# Patient Record
Sex: MALE | Race: WHITE | NOT HISPANIC OR LATINO | Employment: UNEMPLOYED | URBAN - METROPOLITAN AREA
[De-identification: names, ages, dates, MRNs, and addresses within clinical notes are randomized per-mention and may not be internally consistent; named-entity substitution may affect disease eponyms.]

---

## 2022-12-05 ENCOUNTER — OFFICE VISIT (OUTPATIENT)
Dept: FAMILY MEDICINE | Facility: CLINIC | Age: 22
End: 2022-12-05
Payer: COMMERCIAL

## 2022-12-05 ENCOUNTER — TELEPHONE (OUTPATIENT)
Dept: PSYCHIATRY | Facility: CLINIC | Age: 22
End: 2022-12-05
Payer: COMMERCIAL

## 2022-12-05 VITALS
BODY MASS INDEX: 18.68 KG/M2 | HEART RATE: 79 BPM | RESPIRATION RATE: 18 BRPM | TEMPERATURE: 98 F | OXYGEN SATURATION: 99 % | SYSTOLIC BLOOD PRESSURE: 112 MMHG | HEIGHT: 69 IN | DIASTOLIC BLOOD PRESSURE: 80 MMHG | WEIGHT: 126.13 LBS

## 2022-12-05 DIAGNOSIS — G43.009 MIGRAINE WITHOUT AURA AND WITHOUT STATUS MIGRAINOSUS, NOT INTRACTABLE: ICD-10-CM

## 2022-12-05 DIAGNOSIS — F41.0 ANXIETY DISORDER DUE TO GENERAL MEDICAL CONDITION WITH PANIC ATTACK: Primary | ICD-10-CM

## 2022-12-05 DIAGNOSIS — F32.1 MODERATE MAJOR DEPRESSION: ICD-10-CM

## 2022-12-05 DIAGNOSIS — F06.4 ANXIETY DISORDER DUE TO GENERAL MEDICAL CONDITION WITH PANIC ATTACK: Primary | ICD-10-CM

## 2022-12-05 PROBLEM — G43.909 MIGRAINES: Status: ACTIVE | Noted: 2022-12-05

## 2022-12-05 PROCEDURE — 3074F SYST BP LT 130 MM HG: CPT | Mod: CPTII,S$GLB,, | Performed by: INTERNAL MEDICINE

## 2022-12-05 PROCEDURE — 3074F PR MOST RECENT SYSTOLIC BLOOD PRESSURE < 130 MM HG: ICD-10-PCS | Mod: CPTII,S$GLB,, | Performed by: INTERNAL MEDICINE

## 2022-12-05 PROCEDURE — 3008F BODY MASS INDEX DOCD: CPT | Mod: CPTII,S$GLB,, | Performed by: INTERNAL MEDICINE

## 2022-12-05 PROCEDURE — 3008F PR BODY MASS INDEX (BMI) DOCUMENTED: ICD-10-PCS | Mod: CPTII,S$GLB,, | Performed by: INTERNAL MEDICINE

## 2022-12-05 PROCEDURE — 99204 PR OFFICE/OUTPT VISIT, NEW, LEVL IV, 45-59 MIN: ICD-10-PCS | Mod: S$GLB,,, | Performed by: INTERNAL MEDICINE

## 2022-12-05 PROCEDURE — 99999 PR PBB SHADOW E&M-NEW PATIENT-LVL V: ICD-10-PCS | Mod: PBBFAC,,, | Performed by: INTERNAL MEDICINE

## 2022-12-05 PROCEDURE — 99999 PR PBB SHADOW E&M-NEW PATIENT-LVL V: CPT | Mod: PBBFAC,,, | Performed by: INTERNAL MEDICINE

## 2022-12-05 PROCEDURE — 1160F RVW MEDS BY RX/DR IN RCRD: CPT | Mod: CPTII,S$GLB,, | Performed by: INTERNAL MEDICINE

## 2022-12-05 PROCEDURE — 1159F PR MEDICATION LIST DOCUMENTED IN MEDICAL RECORD: ICD-10-PCS | Mod: CPTII,S$GLB,, | Performed by: INTERNAL MEDICINE

## 2022-12-05 PROCEDURE — 3079F PR MOST RECENT DIASTOLIC BLOOD PRESSURE 80-89 MM HG: ICD-10-PCS | Mod: CPTII,S$GLB,, | Performed by: INTERNAL MEDICINE

## 2022-12-05 PROCEDURE — 1159F MED LIST DOCD IN RCRD: CPT | Mod: CPTII,S$GLB,, | Performed by: INTERNAL MEDICINE

## 2022-12-05 PROCEDURE — 3079F DIAST BP 80-89 MM HG: CPT | Mod: CPTII,S$GLB,, | Performed by: INTERNAL MEDICINE

## 2022-12-05 PROCEDURE — 1160F PR REVIEW ALL MEDS BY PRESCRIBER/CLIN PHARMACIST DOCUMENTED: ICD-10-PCS | Mod: CPTII,S$GLB,, | Performed by: INTERNAL MEDICINE

## 2022-12-05 PROCEDURE — 99204 OFFICE O/P NEW MOD 45 MIN: CPT | Mod: S$GLB,,, | Performed by: INTERNAL MEDICINE

## 2022-12-05 RX ORDER — ESCITALOPRAM OXALATE 10 MG/1
10 TABLET ORAL DAILY
Qty: 30 TABLET | Refills: 0 | Status: SHIPPED | OUTPATIENT
Start: 2022-12-05 | End: 2023-12-05

## 2022-12-05 NOTE — TELEPHONE ENCOUNTER
Pt called ot make NP appt. [facility] Appt made tyra/ shaniqua for Bruno 3 @ 8am. Pt made aware no benzos or stimulants rx'd. No concerns voiced.

## 2022-12-05 NOTE — PROGRESS NOTES
SUBJECTIVE     Chief Complaint   Patient presents with    Establish [facility]  Pasha Gomez is a 22 y.o. male with multiple medical diagnoses as listed in the medical history and problem list that presents for establishment of care. Pt has physical, but mostly mental ailments. He has desire and appetite to eat, but when he does he has nausea. Pt also lacks the energy to eat. Pt also reports fatigue, but has insomnia with difficulty falling and staying asleep. Pt also report nightmares. He has some memory impairment(short-term with some long-term). He also has bad anxiety with occasional panic attacks such that he feels something sitting on his chest with difficulty breathing. His head also spins and he feels like something bad will happen all the time. Pt denies any SI/HI. He would like to see Psychiatry and start meds.     PAST MEDICAL HISTORY:  Past Medical History:   Diagnosis Date    Migraines        PAST SURGICAL HISTORY:  History reviewed. No pertinent surgical history.    SOCIAL HISTORY:  Social History     Socioeconomic History    Marital status: Single   Tobacco Use    Smoking status: Never    Smokeless tobacco: Never   Substance and Sexual Activity    Alcohol use: Not Currently    Drug use: Not Currently       FAMILY HISTORY:  Family History   Problem Relation Age of Onset    Diabetes type I Mother     Squamous cell carcinoma Father         metastatic    Diabetes type II Maternal Grandmother        ALLERGIES AND MEDICATIONS: updated and reviewed.  Review of patient's allergies indicates:  No Known Allergies  Current Outpatient Medications   Medication Sig Dispense Refill    EScitalopram oxalate (LEXAPRO) 10 MG tablet Take 1 tablet (10 mg total) by mouth once daily. 30 tablet 0     No current facility-administered medications for this visit.       ROS  Review of Systems   Constitutional:  Negative for chills and fever.   HENT:  Negative for hearing loss and sore throat.    Eyes:  Negative for  "visual disturbance.   Respiratory:  Negative for cough and shortness of breath.    Cardiovascular:  Negative for chest pain, palpitations and leg swelling.   Gastrointestinal:  Negative for abdominal pain, constipation, diarrhea, nausea and vomiting.   Genitourinary:  Negative for dysuria, frequency and urgency.   Musculoskeletal:  Negative for arthralgias, joint swelling and myalgias.   Skin:  Negative for rash and wound.   Neurological:  Negative for headaches.   Psychiatric/Behavioral:  Positive for confusion, dysphoric mood and sleep disturbance. Negative for agitation. The patient is nervous/anxious.        OBJECTIVE     Physical Exam  Vitals:    12/05/22 0946   BP: 112/80   Pulse: 79   Resp: 18   Temp: 98.2 °F (36.8 °C)    Body mass index is 18.62 kg/m².  Weight: 57.2 kg (126 lb 1.7 oz)   Height: 5' 9" (175.3 cm)     Physical Exam  Constitutional:       General: He is not in acute distress.     Appearance: He is well-developed.   HENT:      Head: Normocephalic and atraumatic.      Right Ear: External ear normal.      Left Ear: External ear normal.      Nose: Nose normal.   Eyes:      General: No scleral icterus.        Right eye: No discharge.         Left eye: No discharge.      Conjunctiva/sclera: Conjunctivae normal.   Neck:      Vascular: No JVD.      Trachea: No tracheal deviation.   Pulmonary:      Effort: Pulmonary effort is normal. No respiratory distress.   Musculoskeletal:         General: No deformity. Normal range of motion.      Cervical back: Normal range of motion and neck supple.   Skin:     General: Skin is dry.      Findings: No erythema or rash.   Neurological:      Mental Status: He is alert and oriented to person, place, and time.      Motor: No abnormal muscle tone.      Coordination: Coordination normal.   Psychiatric:         Mood and Affect: Mood is depressed. Affect is flat.         Behavior: Behavior is slowed and withdrawn.         Thought Content: Thought content normal. Thought " content does not include homicidal or suicidal ideation.         Cognition and Memory: Memory is impaired.         Judgment: Judgment normal.         Health Maintenance         Date Due Completion Date    Hepatitis C Screening Never done ---    Lipid Panel Never done ---    COVID-19 Vaccine (1) Never done ---    HIV Screening Never done ---    TETANUS VACCINE Never done ---    Influenza Vaccine (1) Never done ---              ASSESSMENT     22 y.o. male with     1. Anxiety disorder due to general medical condition with panic attack    2. Moderate major depression    3. Migraine without aura and without status migrainosus, not intractable        PLAN:     1. Anxiety disorder due to general medical condition with panic attack  - Start trial Lexapro and patient to call and schedule appointment for psychiatry and counseling  - Ambulatory referral/consult to Psychiatry; Future  - EScitalopram oxalate (LEXAPRO) 10 MG tablet; Take 1 tablet (10 mg total) by mouth once daily.  Dispense: 30 tablet; Refill: 0    2. Moderate major depression  - as above  - Ambulatory referral/consult to Psychiatry; Future  - EScitalopram oxalate (LEXAPRO) 10 MG tablet; Take 1 tablet (10 mg total) by mouth once daily.  Dispense: 30 tablet; Refill: 0    3. Migraine without aura and without status migrainosus, not intractable  - Stable; no acute issues          RTC in 4 weeks for repeat assessment of current treatment plan       Roxanne Mott MD  12/05/2022 10:00 AM        No follow-ups on file.

## 2022-12-05 NOTE — LETTER
December 5, 2022      [facility] [place] [address] [address]  Phone: [phone]  Fax: [phone]       Patient: Pasha Gomez   YOB: 2000  Date of Visit: 12/05/2022    To Whom It May Concern:    Shefali Gomez  was at [facility] on 12/05/2022. The patient may return to work/school on 12/6/2022 with no restrictions. If you have any questions or concerns, or if I can be of further assistance, please do not hesitate to contact me.    Sincerely,    Roxanne Mott MD

## 2023-03-20 ENCOUNTER — TELEPHONE (OUTPATIENT)
Dept: PSYCHIATRY | Facility: CLINIC | Age: 23
End: 2023-03-20
Payer: COMMERCIAL

## 2024-11-12 ENCOUNTER — OFFICE VISIT (OUTPATIENT)
Dept: URGENT CARE | Facility: CLINIC | Age: 24
End: 2024-11-12
Payer: OTHER MISCELLANEOUS

## 2024-11-12 VITALS
DIASTOLIC BLOOD PRESSURE: 81 MMHG | TEMPERATURE: 98 F | BODY MASS INDEX: 18.61 KG/M2 | HEART RATE: 64 BPM | OXYGEN SATURATION: 98 % | WEIGHT: 130 LBS | RESPIRATION RATE: 18 BRPM | SYSTOLIC BLOOD PRESSURE: 119 MMHG | HEIGHT: 70 IN

## 2024-11-12 DIAGNOSIS — Z02.6 ENCOUNTER RELATED TO WORKER'S COMPENSATION CLAIM: Primary | ICD-10-CM

## 2024-11-12 DIAGNOSIS — S61.217A LACERATION OF LEFT LITTLE FINGER WITHOUT FOREIGN BODY WITHOUT DAMAGE TO NAIL, INITIAL ENCOUNTER: ICD-10-CM

## 2024-11-12 LAB
BREATH ALCOHOL: 0
CTP QC/QA: YES
POC 10 PANEL DRUG SCREEN: NEGATIVE

## 2024-11-12 NOTE — PROGRESS NOTES
Subjective:      Patient ID: Pasha Gomez is a 24 y.o. male.    Chief Complaint: Laceration (DOI: 11/12/2024 LT 5th digit/Lm)    Patient's place of employment - [employer]  Patient's job title -   Date of injury - 11/12/2024  Body part injured including left or right - LT 5th Digit  Injury Mechanism - Coke Can  What they were doing when they got hurt - Pt was getting a coke can that was stuck in bench, when he pulled the can out, it tore and cut Pt finger  What they did immediately after - Reported and cleaned the area  Pain scale right now - 1/10  LM    Patient is a 24-year-old male who cut his left 5th digit mid phalangeal region curvilinear laceration that closes on its own with a coat can.  He works at facilities.  Distally neurovascularly intact and superficial laceration.  Closed with Dermabond.  No foreign bodies.  Will be allowed to wear splint and limit the use of the left hand and work light duty for 1 week and return to clinic in 1 week for anticipated discharge at that time.  No complications with closure.  Return to clinic 1 week. LAST TETANUS 2020    Laceration   The incident occurred 1 to 3 hours ago. Pain location: 5th digit. Injury mechanism: coke can. The pain is at a severity of 1/10. The pain is mild. The pain has been Fluctuating since onset. His tetanus status is out of date.     ROS  Constitution: Negative for chills and fever.   HENT:  Negative for postnasal drip, sinus pain and sore throat.    Neck: Negative for neck pain and neck stiffness.   Cardiovascular:  Negative for chest pain and palpitations.   Respiratory:  Negative for cough and shortness of breath.    Genitourinary:  Negative for dysuria and hematuria.   Musculoskeletal:  Positive for pain and joint pain.   Skin:  Positive for laceration. Negative for wound and erythema.   Neurological:  Negative for altered mental status, numbness and tingling.   Psychiatric/Behavioral:  Negative for altered mental  status.      Objective:     Physical Exam  Vitals and nursing note reviewed.   Constitutional:       Appearance: He is well-developed.   HENT:      Head: Normocephalic and atraumatic. No abrasion, contusion or laceration.      Right Ear: External ear normal.      Left Ear: External ear normal.      Nose: Nose normal.   Eyes:      General: Lids are normal.      Conjunctiva/sclera: Conjunctivae normal.      Pupils: Pupils are equal, round, and reactive to light.   Neck:      Trachea: Trachea and phonation normal.   Cardiovascular:      Rate and Rhythm: Normal rate and regular rhythm.      Heart sounds: Normal heart sounds.   Pulmonary:      Effort: Pulmonary effort is normal. No respiratory distress.      Breath sounds: Normal breath sounds. No stridor.   Musculoskeletal:         General: Tenderness and signs of injury present. No swelling. Normal range of motion.      Cervical back: Full passive range of motion without pain and neck supple.   Skin:     General: Skin is warm and dry.      Capillary Refill: Capillary refill takes less than 2 seconds.      Findings: No abrasion, bruising, burn, ecchymosis, erythema, laceration, lesion or rash.      Comments: 1 CM CURVILINEAR LACERATION TO THE LEFT 5TH DIGIT VOLAR SURFACE.  COMES TOGETHER NATURALLY.  NORMAL FLEXION AND EXTENSION AGAINST RESISTANCE NO FOREIGN BODIES DISTALLY NEUROVASCULARLY INTACT.  AMENABLE TO DERMABOND.  CLOSED WITH DERMABOND AND SPLINTED.   Neurological:      Mental Status: He is alert and oriented to person, place, and time.   Psychiatric:         Speech: Speech normal.         Behavior: Behavior normal.         Thought Content: Thought content normal.         Judgment: Judgment normal.        Laceration Repair    Date/Time: 11/12/2024 10:45 AM    Performed by: Vargas Scruggs MD  Authorized by: Vargas Scruggs MD  Consent Done: Yes  Consent: Verbal consent obtained. Written consent not obtained.  Risks and benefits: risks, benefits and  "alternatives were discussed  Consent given by: patient  Patient understanding: patient states understanding of the procedure being performed  Imaging studies: imaging studies available  Required items: required blood products, implants, devices, and special equipment available  Patient identity confirmed: name and verbally with patient  Time out: Immediately prior to procedure a "time out" was called to verify the correct patient, procedure, equipment, support staff and site/side marked as required.  Foreign bodies: no foreign bodies  Tendon involvement: none  Nerve involvement: none  Vascular damage: no    Patient sedated: no  Preparation: Patient was prepped and draped in the usual sterile fashion.  Irrigation solution: saline  Irrigation method: syringe  Amount of cleaning: standard  Debridement: minimal  Degree of undermining: none  Skin closure: glue  Technique: simple  Approximation: close  Approximation difficulty: simple  Dressing: 4x4 sterile gauze and antibiotic ointment  Patient tolerance: Patient tolerated the procedure well with no immediate complications  Comments: GOOD COSMESIS  GOOD HEMOSTASIS  NO COMPLICATIONS  GIVEN DERMABOND INSTRUCTIONS       Assessment:      1. Encounter related to worker's compensation claim    2. Laceration of left little finger without foreign body without damage to nail, initial encounter      Plan:     Patient is a 24-year-old male who cut his left 5th digit mid phalangeal region curvilinear laceration that closes on its own with a coat can.  He works at facilities.  Distally neurovascularly intact and superficial laceration.  Closed with Dermabond.  No foreign bodies.  Will be allowed to wear splint and limit the use of the left hand and work light duty for 1 week and return to clinic in 1 week for anticipated discharge at that time.  No complications with closure.  Return to clinic 1 week     Patient Instructions: Keep dressing clean/dry/covered, Use splint as directed "   Restrictions: Limited use of left hand and arm  Follow up in about 1 week (around 11/19/2024).

## 2024-11-12 NOTE — LETTER
[facility] [place] [id number] [facility], [address]  [place] [address]  Phone: [phone]  Fax: [phone]  [facility]: [id number]    Pt Name: Pasha Gomez  Injury Date: 11/12/2024   Employee ID: [id number] Date of First Treatment: 11/12/2024   Company: [employer] [place] record [employer] [facility] [place] [employer]      Appointment Time: walk-in Arrived: 10:45 am   Provider: Vargas Scruggs MD Time Out: 12:05 pm     Office Treatment:   1. Encounter related to worker's compensation claim    2. Laceration of left little finger without foreign body without damage to nail, initial encounter          Patient Instructions: Keep dressing clean/dry/covered, Use splint as directed    Restrictions: Limited use of left hand and arm     Return Appointment: 11/19/2024 at 10:00 am  BRUCE

## 2024-11-19 ENCOUNTER — OFFICE VISIT (OUTPATIENT)
Dept: URGENT CARE | Facility: CLINIC | Age: 24
End: 2024-11-19
Payer: OTHER MISCELLANEOUS

## 2024-11-19 VITALS
BODY MASS INDEX: 18.61 KG/M2 | DIASTOLIC BLOOD PRESSURE: 67 MMHG | OXYGEN SATURATION: 97 % | HEART RATE: 77 BPM | TEMPERATURE: 98 F | SYSTOLIC BLOOD PRESSURE: 107 MMHG | HEIGHT: 70 IN | WEIGHT: 130 LBS | RESPIRATION RATE: 16 BRPM

## 2024-11-19 DIAGNOSIS — Z02.6 ENCOUNTER RELATED TO WORKER'S COMPENSATION CLAIM: Primary | ICD-10-CM

## 2024-11-19 DIAGNOSIS — S61.217D LACERATION OF LEFT LITTLE FINGER WITHOUT FOREIGN BODY WITHOUT DAMAGE TO NAIL, SUBSEQUENT ENCOUNTER: ICD-10-CM

## 2024-11-19 PROCEDURE — 99214 OFFICE O/P EST MOD 30 MIN: CPT | Mod: S$GLB,,, | Performed by: EMERGENCY MEDICINE

## 2024-11-19 NOTE — PROGRESS NOTES
Subjective:      Patient ID: Pasha Gomez is a 24 y.o. male.    Chief Complaint: Hand Injury (DOI:11/12/2024--Left 5th finger/SW)    Patient's place of employment - [employer]  Patient's job title -   Date of Injury - 11/12/2024  Body part injured - Left fifth finger  Current work status per last visit - Light duty  Improved, same, or worse - Improved  Pain Scale right now (1-10) -  0/10  SW    Fifth digit healed nicely with no complications.  Dermabond fell off a few days ago.  He is and has been working regular duty protecting his left finger and will be released to work regular duty without restrictions and discharge from occupational health.    Hand Injury   Pertinent negatives include no chest pain or numbness.         ros  Constitution: Negative for chills and fever.   HENT:  Negative for postnasal drip, sinus pain and sore throat.    Neck: Negative for neck pain and neck stiffness.   Cardiovascular:  Negative for chest pain and palpitations.   Respiratory:  Negative for cough and shortness of breath.    Genitourinary:  Negative for dysuria and hematuria.   Musculoskeletal:  Negative for pain and joint pain.   Skin:  Positive for laceration. Negative for wound and erythema.   Neurological:  Negative for altered mental status, numbness and tingling.   Psychiatric/Behavioral:  Negative for altered mental status.      Objective:     Physical Exam  Vitals and nursing note reviewed.   Constitutional:       Appearance: He is well-developed.   HENT:      Head: Normocephalic and atraumatic. No abrasion, contusion or laceration.      Right Ear: External ear normal.      Left Ear: External ear normal.      Nose: Nose normal.   Eyes:      General: Lids are normal.      Conjunctiva/sclera: Conjunctivae normal.      Pupils: Pupils are equal, round, and reactive to light.   Neck:      Trachea: Trachea and phonation normal.   Cardiovascular:      Rate and Rhythm: Normal rate and regular rhythm.       Heart sounds: Normal heart sounds.   Pulmonary:      Effort: Pulmonary effort is normal. No respiratory distress.      Breath sounds: Normal breath sounds. No stridor.   Musculoskeletal:         General: Signs of injury present. No swelling or tenderness. Normal range of motion.      Cervical back: Full passive range of motion without pain and neck supple.   Skin:     General: Skin is warm and dry.      Capillary Refill: Capillary refill takes less than 2 seconds.      Findings: No abrasion, bruising, burn, ecchymosis, erythema, laceration, lesion or rash.      Comments: Well and completely healed left finger laceration normal range of motion and sensation.  No signs of infection.   Neurological:      Mental Status: He is alert and oriented to person, place, and time.   Psychiatric:         Speech: Speech normal.         Behavior: Behavior normal.         Thought Content: Thought content normal.         Judgment: Judgment normal.        Assessment:      1. Encounter related to worker's compensation claim    2. Laceration of left little finger without foreign body without damage to nail, subsequent encounter      Plan:     Fifth digit healed nicely with no complications.  Dermabond fell off a few days ago.  He is and has been working regular duty protecting his left finger and will be released to work regular duty without restrictions and discharge from occupational health.     Patient Instructions: Attention not to aggravate affected area   Restrictions: Regular Duty, Discharged from Occupational Health  Follow up if symptoms worsen or fail to improve.

## 2024-11-19 NOTE — LETTER
[facility] [place] [address] [facility], [address]  [place] [address]  Phone: [phone]  Fax: [phone]  [facility]: [id number]    Pt Name: Pasha Gomez  Injury Date: 11/12/2024   Employee ID: [id number] Date of Treatment: 11/19/2024   Company: [employer] to [employer] [facility] [facility] [place] [employer]      Appointment Time: 09:45 AM Arrived: 9:55 am   Provider: Vargas Scruggs MD Time Out:11:40 am     Office Treatment:   1. Encounter related to worker's compensation claim    2. Laceration of left little finger without foreign body without damage to nail, subsequent encounter          Patient Instructions: Attention not to aggravate affected area    Restrictions: Regular Duty, Discharged from Occupational Health     Return Appointment: Follow up if symptoms worsen or fail to improve.  Priyank

## 2025-04-15 ENCOUNTER — PATIENT OUTREACH (OUTPATIENT)
Dept: ADMINISTRATIVE | Facility: HOSPITAL | Age: 25
End: 2025-04-15